# Patient Record
Sex: MALE | Race: WHITE | NOT HISPANIC OR LATINO | URBAN - METROPOLITAN AREA
[De-identification: names, ages, dates, MRNs, and addresses within clinical notes are randomized per-mention and may not be internally consistent; named-entity substitution may affect disease eponyms.]

---

## 2020-06-05 ENCOUNTER — EMERGENCY (EMERGENCY)
Facility: HOSPITAL | Age: 75
LOS: 1 days | Discharge: HOME | End: 2020-06-05
Attending: EMERGENCY MEDICINE | Admitting: EMERGENCY MEDICINE
Payer: MEDICARE

## 2020-06-05 VITALS
TEMPERATURE: 98 F | DIASTOLIC BLOOD PRESSURE: 64 MMHG | RESPIRATION RATE: 18 BRPM | SYSTOLIC BLOOD PRESSURE: 126 MMHG | HEART RATE: 58 BPM | OXYGEN SATURATION: 98 %

## 2020-06-05 VITALS
HEIGHT: 66 IN | HEART RATE: 60 BPM | WEIGHT: 164.91 LBS | OXYGEN SATURATION: 98 % | DIASTOLIC BLOOD PRESSURE: 77 MMHG | RESPIRATION RATE: 18 BRPM | SYSTOLIC BLOOD PRESSURE: 139 MMHG | TEMPERATURE: 99 F

## 2020-06-05 DIAGNOSIS — R21 RASH AND OTHER NONSPECIFIC SKIN ERUPTION: ICD-10-CM

## 2020-06-05 DIAGNOSIS — R06.02 SHORTNESS OF BREATH: ICD-10-CM

## 2020-06-05 LAB
ALBUMIN SERPL ELPH-MCNC: 4.1 G/DL — SIGNIFICANT CHANGE UP (ref 3.3–5)
ALP SERPL-CCNC: 103 U/L — SIGNIFICANT CHANGE UP (ref 40–120)
ALT FLD-CCNC: 21 U/L — SIGNIFICANT CHANGE UP (ref 10–45)
ANION GAP SERPL CALC-SCNC: 9 MMOL/L — SIGNIFICANT CHANGE UP (ref 5–17)
AST SERPL-CCNC: 27 U/L — SIGNIFICANT CHANGE UP (ref 10–40)
BASOPHILS # BLD AUTO: 0.11 K/UL — SIGNIFICANT CHANGE UP (ref 0–0.2)
BASOPHILS NFR BLD AUTO: 1.4 % — SIGNIFICANT CHANGE UP (ref 0–2)
BILIRUB SERPL-MCNC: 0.6 MG/DL — SIGNIFICANT CHANGE UP (ref 0.2–1.2)
BUN SERPL-MCNC: 9 MG/DL — SIGNIFICANT CHANGE UP (ref 7–23)
CALCIUM SERPL-MCNC: 9 MG/DL — SIGNIFICANT CHANGE UP (ref 8.4–10.5)
CHLORIDE SERPL-SCNC: 109 MMOL/L — HIGH (ref 96–108)
CO2 SERPL-SCNC: 24 MMOL/L — SIGNIFICANT CHANGE UP (ref 22–31)
CREAT SERPL-MCNC: 0.88 MG/DL — SIGNIFICANT CHANGE UP (ref 0.5–1.3)
CRP SERPL-MCNC: 0.1 MG/DL — SIGNIFICANT CHANGE UP (ref 0–0.4)
D DIMER BLD IA.RAPID-MCNC: <150 NG/ML DDU — SIGNIFICANT CHANGE UP
EOSINOPHIL # BLD AUTO: 0.65 K/UL — HIGH (ref 0–0.5)
EOSINOPHIL NFR BLD AUTO: 8.5 % — HIGH (ref 0–6)
FERRITIN SERPL-MCNC: 113 NG/ML — SIGNIFICANT CHANGE UP (ref 30–400)
GLUCOSE SERPL-MCNC: 106 MG/DL — HIGH (ref 70–99)
HCT VFR BLD CALC: 42.8 % — SIGNIFICANT CHANGE UP (ref 39–50)
HGB BLD-MCNC: 13.5 G/DL — SIGNIFICANT CHANGE UP (ref 13–17)
IMM GRANULOCYTES NFR BLD AUTO: 0.4 % — SIGNIFICANT CHANGE UP (ref 0–1.5)
LYMPHOCYTES # BLD AUTO: 1.94 K/UL — SIGNIFICANT CHANGE UP (ref 1–3.3)
LYMPHOCYTES # BLD AUTO: 25.5 % — SIGNIFICANT CHANGE UP (ref 13–44)
MCHC RBC-ENTMCNC: 30.2 PG — SIGNIFICANT CHANGE UP (ref 27–34)
MCHC RBC-ENTMCNC: 31.5 GM/DL — LOW (ref 32–36)
MCV RBC AUTO: 95.7 FL — SIGNIFICANT CHANGE UP (ref 80–100)
MONOCYTES # BLD AUTO: 0.78 K/UL — SIGNIFICANT CHANGE UP (ref 0–0.9)
MONOCYTES NFR BLD AUTO: 10.2 % — SIGNIFICANT CHANGE UP (ref 2–14)
NEUTROPHILS # BLD AUTO: 4.11 K/UL — SIGNIFICANT CHANGE UP (ref 1.8–7.4)
NEUTROPHILS NFR BLD AUTO: 54 % — SIGNIFICANT CHANGE UP (ref 43–77)
NRBC # BLD: 0 /100 WBCS — SIGNIFICANT CHANGE UP (ref 0–0)
PLATELET # BLD AUTO: 206 K/UL — SIGNIFICANT CHANGE UP (ref 150–400)
POTASSIUM SERPL-MCNC: 4.1 MMOL/L — SIGNIFICANT CHANGE UP (ref 3.5–5.3)
POTASSIUM SERPL-SCNC: 4.1 MMOL/L — SIGNIFICANT CHANGE UP (ref 3.5–5.3)
PROCALCITONIN SERPL-MCNC: 0.04 NG/ML — SIGNIFICANT CHANGE UP (ref 0.02–0.1)
PROT SERPL-MCNC: 7.6 G/DL — SIGNIFICANT CHANGE UP (ref 6–8.3)
RBC # BLD: 4.47 M/UL — SIGNIFICANT CHANGE UP (ref 4.2–5.8)
RBC # FLD: 14 % — SIGNIFICANT CHANGE UP (ref 10.3–14.5)
SODIUM SERPL-SCNC: 142 MMOL/L — SIGNIFICANT CHANGE UP (ref 135–145)
WBC # BLD: 7.62 K/UL — SIGNIFICANT CHANGE UP (ref 3.8–10.5)
WBC # FLD AUTO: 7.62 K/UL — SIGNIFICANT CHANGE UP (ref 3.8–10.5)

## 2020-06-05 PROCEDURE — 84145 PROCALCITONIN (PCT): CPT

## 2020-06-05 PROCEDURE — 99284 EMERGENCY DEPT VISIT MOD MDM: CPT | Mod: 25

## 2020-06-05 PROCEDURE — 99285 EMERGENCY DEPT VISIT HI MDM: CPT

## 2020-06-05 PROCEDURE — 71045 X-RAY EXAM CHEST 1 VIEW: CPT

## 2020-06-05 PROCEDURE — 85379 FIBRIN DEGRADATION QUANT: CPT

## 2020-06-05 PROCEDURE — 80053 COMPREHEN METABOLIC PANEL: CPT

## 2020-06-05 PROCEDURE — 85025 COMPLETE CBC W/AUTO DIFF WBC: CPT

## 2020-06-05 PROCEDURE — 36415 COLL VENOUS BLD VENIPUNCTURE: CPT

## 2020-06-05 PROCEDURE — 82728 ASSAY OF FERRITIN: CPT

## 2020-06-05 PROCEDURE — 86140 C-REACTIVE PROTEIN: CPT

## 2020-06-05 PROCEDURE — 71045 X-RAY EXAM CHEST 1 VIEW: CPT | Mod: 26

## 2020-06-05 NOTE — ED ADULT TRIAGE NOTE - CHIEF COMPLAINT QUOTE
Patient complaining of SOB and BARNES for one week.  Patient states he was Covid-19+ two months ago.  Patient denies any CP, dizziness or any other complaints at this time.

## 2020-06-05 NOTE — ED ADULT NURSE REASSESSMENT NOTE - NS ED NURSE REASSESS COMMENT FT1
Patient a/oX 3, c/o of episodes of SOB, worse on exertion, no coughing or wheezing, bilateral lungs CTA.  Sinus bradycardia on EKG and cardiac monitor, vital signs stable, O2 sat 99% on RA,  95% ambulatory.  Right AC PIV #20 in place, all labs sent,no complications.  Xray done.  Results and disposition pending.  Stable and comfortable.

## 2020-06-05 NOTE — ED PROVIDER NOTE - SKIN, MLM
+ macular rash to face - along the face mask outline, no vesicles, no pustules, no super imposed inf

## 2020-06-05 NOTE — ED ADULT NURSE NOTE - CHPI ED NUR SYMPTOMS NEG
no diaphoresis/no body aches/no cough/no chills/no headache/no edema/no chest pain/no hemoptysis/no wheezing/no fever

## 2020-06-05 NOTE — ED PROVIDER NOTE - RESPIRATORY, MLM
Breath sounds clear and equal bilaterally. no wheezes b/l, no rhonchi b/l, no rales b/l. walking around w/o sob, pulse ox 95%

## 2020-06-05 NOTE — ED PROVIDER NOTE - OBJECTIVE STATEMENT
The pt is a 73 y/o M, who presents to ED w/multiple c/o: 1. c/o rash to face x 1 wk, states no pain, no itching, no new soap or detergent, no new foods; 2. states "i'm here for a check up" - states covid19+ 6 wks ago, wants to "know if it's gone"; 3. sob when walking sometimes. Denies cp, palpitations, fevers, chills, cough, n/v/d, abd pain, leg pain or swelling. Has not f/u w/pmd

## 2020-06-05 NOTE — ED ADULT NURSE NOTE - OBJECTIVE STATEMENT
Patient c/o of 1 week episodes of SOB w/ episodes of dyspnea on exertion, no chest pain, no cough, no syncope or fevers.  States tested +Covid last March and was hospitalized X 8 days in [facility].  States 4 weeks ago tested negative for Covid.  EKG Sinus bradycardia on EKG.

## 2020-06-05 NOTE — ED PROVIDER NOTE - ATTENDING CONTRIBUTION TO CARE
diagnosed covid-19 6 wks ago, and new rash to his face. rash is macular in area of n95. still feels SOB w/ ambulation. 99% on RA here, lungs clear, well appearing older man. will check amb O2 sat, labs, reassess.

## 2020-06-05 NOTE — ED PROVIDER NOTE - PATIENT PORTAL LINK FT
You can access the FollowMyHealth Patient Portal offered by [facility] by registering at the following website: [email or web address]. By joining [employer]’s FollowMyHealth portal, you will also be able to view your health information using other applications (apps) compatible with our system.

## 2020-06-05 NOTE — ED PROVIDER NOTE - CLINICAL SUMMARY MEDICAL DECISION MAKING FREE TEXT BOX
pt presents to ED w/multiple c/o: 1. rash to face - appears to be contact dermatitis 2/2 to facial mask, no super imposed inf, no generalized rash to body; 2. wanting a post covid19 inf "check up" - states feels sob when walking sometimes - no hypoxia, ambulatory pulse ox 95%, hemodynamically stable, ekg non ischemic, labs wnl, cxr clear. pt reassured and stable for dc, understands and agrees w/plan, strict return precautions given

## 2020-06-05 NOTE — ED ADULT NURSE REASSESSMENT NOTE - NS ED NURSE REASSESS COMMENT FT1
Patient a/oX 3, no c/o of SOB or any other symptom complaint, vital signs stable, all labs, tests resulted, discharged to home in stable condition.

## 2020-06-05 NOTE — ED PROVIDER NOTE - NSFOLLOWUPINSTRUCTIONS_ED_ALL_ED_FT
Shortness of breath    Shortness of breath (dyspnea) means you have trouble breathing and could indicate a medical problem. Causes include lung disease, heart disease, low amount of red blood cells (anemia), poor physical fitness, being overweight, smoking, etc. Your health care provider today may not be able to find a cause for your shortness of breath after your exam. In this case, it is important to have a follow-up exam with your primary care physician as instructed. If medicines were prescribed, take them as directed for the full length of time directed. Refrain from tobacco products.    SEEK IMMEDIATE MEDICAL CARE IF YOU HAVE ANY OF THE FOLLOWING SYMPTOMS: worsening shortness of breath, chest pain, back pain, abdominal pain, fever, coughing up blood, lightheadedness/dizziness.   Rash    A rash is a change in the color of the skin. A rash can also change the way your skin feels. There are many different conditions and factors that can cause a rash, most of which are not dangerous. Make sure to follow up with your primary care physician or a dermatologist as instructed by your health care provider.    SEEK IMMEDIATE MEDICAL CARE IF YOU HAVE ANY OF THE FOLLOWING SYMPTOMS: fever, blisters, a rash inside your mouth, vaginal or anal pain, or altered mental status.

## 2020-06-05 NOTE — ED PROVIDER NOTE - CARE PLAN
Principal Discharge DX:	SOB (shortness of breath)  Secondary Diagnosis:	Rash and nonspecific skin eruption

## 2021-08-16 NOTE — ED ADULT NURSE NOTE - CHIEF COMPLAINT
The patient is a 74y Male complaining of shortness of breath. [Follow-Up - Clinic] : a clinic follow-up of [Dyspnea] : dyspnea [Palpitations] : palpitations [FreeTextEntry2] : cardiac clearance for bariatric surgery  [FreeTextEntry1] : murmur